# Patient Record
Sex: FEMALE | Race: WHITE | HISPANIC OR LATINO | URBAN - METROPOLITAN AREA
[De-identification: names, ages, dates, MRNs, and addresses within clinical notes are randomized per-mention and may not be internally consistent; named-entity substitution may affect disease eponyms.]

---

## 2019-09-03 ENCOUNTER — EMERGENCY (EMERGENCY)
Facility: HOSPITAL | Age: 79
LOS: 1 days | Discharge: HOME | End: 2019-09-03
Attending: EMERGENCY MEDICINE | Admitting: EMERGENCY MEDICINE
Payer: COMMERCIAL

## 2019-09-03 VITALS
RESPIRATION RATE: 17 BRPM | WEIGHT: 207.9 LBS | HEART RATE: 60 BPM | SYSTOLIC BLOOD PRESSURE: 180 MMHG | OXYGEN SATURATION: 97 % | HEIGHT: 62 IN | TEMPERATURE: 99 F | DIASTOLIC BLOOD PRESSURE: 78 MMHG

## 2019-09-03 VITALS
HEART RATE: 62 BPM | RESPIRATION RATE: 16 BRPM | OXYGEN SATURATION: 98 % | SYSTOLIC BLOOD PRESSURE: 156 MMHG | DIASTOLIC BLOOD PRESSURE: 67 MMHG

## 2019-09-03 PROCEDURE — 99284 EMERGENCY DEPT VISIT MOD MDM: CPT | Mod: 25

## 2019-09-03 PROCEDURE — 70480 CT ORBIT/EAR/FOSSA W/O DYE: CPT

## 2019-09-03 PROCEDURE — 70450 CT HEAD/BRAIN W/O DYE: CPT

## 2019-09-03 PROCEDURE — 70480 CT ORBIT/EAR/FOSSA W/O DYE: CPT | Mod: 26,59

## 2019-09-03 PROCEDURE — 73562 X-RAY EXAM OF KNEE 3: CPT

## 2019-09-03 PROCEDURE — 73562 X-RAY EXAM OF KNEE 3: CPT | Mod: 26,LT

## 2019-09-03 PROCEDURE — 70450 CT HEAD/BRAIN W/O DYE: CPT | Mod: 26

## 2019-09-03 PROCEDURE — 99284 EMERGENCY DEPT VISIT MOD MDM: CPT

## 2019-09-03 RX ORDER — ACETAMINOPHEN 500 MG
650 TABLET ORAL ONCE
Refills: 0 | Status: COMPLETED | OUTPATIENT
Start: 2019-09-03 | End: 2019-09-03

## 2019-09-03 RX ADMIN — Medication 650 MILLIGRAM(S): at 21:38

## 2019-09-03 RX ADMIN — Medication 650 MILLIGRAM(S): at 21:08

## 2019-09-03 NOTE — ED PROVIDER NOTE - ATTENDING CONTRIBUTION TO CARE
I have personally seen and examined this patient. I have fully participated in the care of this patient. I have reviewed all pertinent clinical information, including history physical exam, plan and the PA's note and agree except as noted  77 y/o F h/o HTN, Hypothyroidism s/p trip and fall down 3 steps hitting her right side of her head and face and left knee. Ambulatory. Denies LOC, visual changes, weakness, numbness, tingling, nausea, vomiting, dizziness. Appears well. NOT on blood thinners. Took no meds prior to arrival.  PE:  Mod size ecchymosis on right frontal area,   EYES: PERRLA, EOMI pt has baseline vision problem after her eye surgery  Neck : supple   lung : clear  CVS: S1S2

## 2019-09-03 NOTE — ED PROVIDER NOTE - OBJECTIVE STATEMENT
77 y/o F h/o HTN, Hypothyroidism s/p trip and fall down 3 steps hitting her right side of her head and face and left knee. Ambulatory. Denies LOC, visual changes, weakness, numbness, tingling, nausea, vomiting, dizziness. Appears well. NOT on blood thinners. Took no meds prior to arrival.

## 2019-09-03 NOTE — ED PROVIDER NOTE - RIGHT FACE
Ecchymosis and swelling noted to the medial aspect of the right orbit with TTP/SWELLING/TENDERNESS TO PALPATION

## 2019-09-03 NOTE — ED ADULT NURSE NOTE - NSIMPLEMENTINTERV_GEN_ALL_ED
Implemented All Universal Safety Interventions:  [place] to call system. Call bell, personal items and telephone within reach. Instruct patient to call for assistance. Room bathroom lighting operational. Non-slip footwear when patient is off stretcher. Physically safe environment: no spills, clutter or unnecessary equipment. Stretcher in lowest position, wheels locked, appropriate side rails in place.

## 2019-09-03 NOTE — ED PROVIDER NOTE - CLINICAL SUMMARY MEDICAL DECISION MAKING FREE TEXT BOX
77 y/o F h/o HTN, Hypothyroidism s/p trip and fall down 3 steps hitting her right side of her head and face and left knee. Ambulatory. Denies LOC, visual changes, weakness, numbness, tingling, nausea, vomiting, dizziness. Appears well. NOT on blood thinners- will obtain CT head and orbits r/o fracture or bleed, xray knee, pain control, head precautions discussed, PMD follow up

## 2019-09-03 NOTE — ED PROVIDER NOTE - PATIENT PORTAL LINK FT
You can access the FollowMyHealth Patient Portal offered by [facility] by registering at the following website: [email or web address]. By joining [facility]’s FollowMyHealth portal, you will also be able to view your health information using other applications (apps) compatible with our system.

## 2019-09-03 NOTE — ED PROVIDER NOTE - PROGRESS NOTE DETAILS
PA Tiberio- CT head negative. CT orbit no acute fracture- incidental post surgical findings. Xray prelim read by Dr. Romo no acute fracture. Will DC home with head precautions, Tylenol, PMD follow up

## 2019-09-03 NOTE — ED PROVIDER NOTE - NSFOLLOWUPINSTRUCTIONS_ED_ALL_ED_FT
Follow up with your PMD within 24-48 hrs   Rest, Take Tylenol 650mg every 4-6 hours as needed for pain.  Apply ice 20 minutes 3 times a day as needed for pain or swelling.   Return to the emergency department with ANY worsening or new concerning symptoms.                                                    Head Injury, Adult:    There are many types of head injuries. Head injuries can be as minor as a bump, or they can be more severe. More severe head injuries include:  A jarring injury to the brain (concussion).  A bruise of the brain (contusion). This means there is bleeding in the brain that can cause swelling.  A cracked skull (skull fracture).  Bleeding in the brain that collects, clots, and forms a bump (hematoma).  After a head injury, you may need to be observed for a while in the emergency department or urgent care. Sometimes admission to the hospital is needed.    After a head injury has happened, most problems occur within the first 24 hours, but side effects may occur up to 7–10 days after the injury. It is important to watch your condition for any changes.    What are the causes?  There are many possible causes of a head injury. A serious head injury may happen to someone who is in a car accident (motor vehicle collision). Other causes of major head injuries include bicycle or motorcycle accidents, sports injuries, and falls.    Risk factors  This condition is more likely to occur in people who:  Drink a lot of alcohol or use drugs.  Are over the age of 65.  Are at risk for falls.  What are the symptoms?  There are many possible symptoms of a head injury. Visible symptoms of a head injury include a bruise, bump, or bleeding at the site of the injury. Other non-visible symptoms include:  Feeling sleepy or not being able to stay awake.  Passing out.  Headache.  Seizures.  Dizziness.  Confusion.  Memory problems.  Nausea or vomiting.  Other possible symptoms that may develop after the head injury include:  Poor attention and concentration.  Fatigue or tiring easily.  Irritability.  Being uncomfortable around bright lights or loud noises.  Anxiety or depression.  Disturbed sleep.  How is this diagnosed?  This condition can usually be diagnosed based on your symptoms, a description of the injury, and a physical exam. You may also have imaging tests done, such as a CT scan or MRI. You will also be closely watched.    How is this treated?  Treatment for this condition depends on the severity and type of injury you have. The main goal of treatment is to prevent complications and allow the brain time to heal.    For mild head injury, you may be sent home and treatment may include:  Observation. A responsible adult should stay with you for 24 hours after your injury and check on you often.  Physical rest.  Brain rest.  Pain medicines.  For severe brain injury, treatment may include:  Close observation. This includes hospitalization with frequent physical exams. You may need to go to a hospital that specializes in head injury.  Pain medicines.  Breathing support. This may include using a ventilator.  Managing the pressure inside the brain (intracranial pressure, or ICP). This may include:  Monitoring the ICP.  Giving medicines to decrease the ICP.  Positioning you to decrease the ICP.  Medicine to prevent seizures.  Surgery to stop bleeding or to remove blood clots (craniotomy).  Surgery to remove part of the skull (decompressive craniectomy). This allows room for the brain to swell.  Follow these instructions at home:  Activity     Rest as much as possible and avoid activities that are physically hard or tiring.  Make sure you get enough sleep.  Limit activities that require a lot of thought or attention, such as:  Watching TV.  Playing memory games and puzzles.  Job-related work or homework.  Working on the computer, social media, and texting.  Avoid activities that could cause another head injury, such as playing sports, until your health care provider approves. Having another head injury, especially before the first one has healed, can be dangerous.  Ask your health care provider when it is safe for you to return to your regular activities, including work or school. Ask your health care provider for a step-by-step plan for gradually returning to activities.  Ask your health care provider when you can drive, ride a bicycle, or use heavy machinery. Your ability to react may be slower after a brain injury. Never do these activities if you are dizzy.  Lifestyle     Do not drink alcohol until your health care provider approves, and avoid drug use. Alcohol and certain drugs may slow your recovery and can put you at risk of further injury.  If it is harder than usual to remember things, write them down.  If you are easily distracted, try to do one thing at a time.  Talk with family members or close friends when making important decisions.  Tell your friends, family, a trusted colleague, and  about your injury, symptoms, and restrictions. Have them watch for any new or worsening problems.  General instructions     Take over-the-counter and prescription medicines only as told by your health care provider.  Have someone stay with you for 24 hours after your head injury. This person should watch you for any changes in your symptoms and be ready to seek medical help, as needed.  Keep all follow-up visits as told by your health care provider. This is important.  How is this prevented?  Work on improving your balance and strength to avoid falls.  Wear a seatbelt when you are in a moving vehicle.  Wear a helmet when riding a bicycle, skiing, or doing any other sport or activity that has a risk of injury.  Drink alcohol only in moderation.  Take safety measures in your home, such as:  Removing clutter and tripping hazards from floors and stairways.  Using grab bars in bathrooms and handrails by stairs.  Placing non-slip mats on floors and in bathtubs.  Improving lighting in dim areas.  Get help right away if:  You have:  A severe headache that is not helped by medicine.  Trouble walking, have weakness in your arms and legs, or lose your balance.  Clear or bloody fluid coming from your nose or ears.  Changes in your vision.  A seizure.  You vomit.  Your symptoms get worse.  Your speech is slurred.  You pass out.  You are sleepier and have trouble staying awake.  Your pupils change size.  These symptoms may represent a serious problem that is an emergency. Do not wait to see if the symptoms will go away. Get medical help right away. Call your local emergency services (911 in the [place] Do not drive yourself to the hospital.     This information is not intended to replace advice given to you by your health care provider. Make sure you discuss any questions you have with your health care provider.

## 2022-03-15 RX ORDER — LEVOTHYROXINE SODIUM 125 MCG
1 TABLET ORAL
Qty: 0 | Refills: 0 | DISCHARGE

## 2022-03-15 RX ORDER — ATENOLOL 25 MG/1
0 TABLET ORAL
Qty: 0 | Refills: 0 | DISCHARGE

## 2022-03-15 RX ORDER — HYDROCHLOROTHIAZIDE 25 MG
0 TABLET ORAL
Qty: 0 | Refills: 0 | DISCHARGE

## 2022-03-15 RX ORDER — LISINOPRIL 2.5 MG/1
0 TABLET ORAL
Qty: 0 | Refills: 0 | DISCHARGE
